# Patient Record
Sex: MALE | Race: ASIAN | Employment: FULL TIME | URBAN - METROPOLITAN AREA
[De-identification: names, ages, dates, MRNs, and addresses within clinical notes are randomized per-mention and may not be internally consistent; named-entity substitution may affect disease eponyms.]

---

## 2024-07-28 ENCOUNTER — APPOINTMENT (OUTPATIENT)
Dept: RADIOLOGY | Facility: HOSPITAL | Age: 34
DRG: 398 | End: 2024-07-28
Payer: COMMERCIAL

## 2024-07-28 ENCOUNTER — HOSPITAL ENCOUNTER (INPATIENT)
Facility: HOSPITAL | Age: 34
LOS: 2 days | Discharge: HOME | DRG: 398 | End: 2024-07-30
Attending: EMERGENCY MEDICINE | Admitting: INTERNAL MEDICINE
Payer: COMMERCIAL

## 2024-07-28 DIAGNOSIS — K35.211 ACUTE APPENDICITIS WITH PERFORATION, GENERALIZED PERITONITIS, AND ABSCESS, UNSPECIFIED WHETHER GANGRENE PRESENT: Primary | ICD-10-CM

## 2024-07-28 DIAGNOSIS — K35.201 ACUTE APPENDICITIS WITH PERFORATION AND GENERALIZED PERITONITIS, UNSPECIFIED WHETHER ABSCESS PRESENT, UNSPECIFIED WHETHER GANGRENE PRESENT: ICD-10-CM

## 2024-07-28 DIAGNOSIS — R10.30 LOWER ABDOMINAL PAIN: ICD-10-CM

## 2024-07-28 LAB
ALBUMIN SERPL BCP-MCNC: 4.2 G/DL (ref 3.4–5)
ALP SERPL-CCNC: 59 U/L (ref 33–120)
ALT SERPL W P-5'-P-CCNC: 33 U/L (ref 10–52)
ANION GAP SERPL CALC-SCNC: 15 MMOL/L (ref 10–20)
APPEARANCE UR: CLEAR
AST SERPL W P-5'-P-CCNC: 18 U/L (ref 9–39)
BASOPHILS # BLD AUTO: 0.03 X10*3/UL (ref 0–0.1)
BASOPHILS NFR BLD AUTO: 0.2 %
BILIRUB SERPL-MCNC: 3 MG/DL (ref 0–1.2)
BILIRUB UR STRIP.AUTO-MCNC: NEGATIVE MG/DL
BUN SERPL-MCNC: 10 MG/DL (ref 6–23)
CALCIUM SERPL-MCNC: 9 MG/DL (ref 8.6–10.3)
CHLORIDE SERPL-SCNC: 98 MMOL/L (ref 98–107)
CO2 SERPL-SCNC: 26 MMOL/L (ref 21–32)
COLOR UR: ABNORMAL
CREAT SERPL-MCNC: 0.95 MG/DL (ref 0.5–1.3)
EGFRCR SERPLBLD CKD-EPI 2021: >90 ML/MIN/1.73M*2
EOSINOPHIL # BLD AUTO: 0.01 X10*3/UL (ref 0–0.7)
EOSINOPHIL NFR BLD AUTO: 0.1 %
ERYTHROCYTE [DISTWIDTH] IN BLOOD BY AUTOMATED COUNT: 15 % (ref 11.5–14.5)
GLUCOSE SERPL-MCNC: 143 MG/DL (ref 74–99)
GLUCOSE UR STRIP.AUTO-MCNC: NORMAL MG/DL
HCT VFR BLD AUTO: 40 % (ref 41–52)
HGB BLD-MCNC: 14.5 G/DL (ref 13.5–17.5)
IMM GRANULOCYTES # BLD AUTO: 0.08 X10*3/UL (ref 0–0.7)
IMM GRANULOCYTES NFR BLD AUTO: 0.5 % (ref 0–0.9)
KETONES UR STRIP.AUTO-MCNC: NEGATIVE MG/DL
LACTATE SERPL-SCNC: 0.8 MMOL/L (ref 0.4–2)
LEUKOCYTE ESTERASE UR QL STRIP.AUTO: NEGATIVE
LYMPHOCYTES # BLD AUTO: 2.33 X10*3/UL (ref 1.2–4.8)
LYMPHOCYTES NFR BLD AUTO: 13.2 %
MAGNESIUM SERPL-MCNC: 1.72 MG/DL (ref 1.6–2.4)
MCH RBC QN AUTO: 33.1 PG (ref 26–34)
MCHC RBC AUTO-ENTMCNC: 36.3 G/DL (ref 32–36)
MCV RBC AUTO: 91 FL (ref 80–100)
MONOCYTES # BLD AUTO: 1.98 X10*3/UL (ref 0.1–1)
MONOCYTES NFR BLD AUTO: 11.2 %
NEUTROPHILS # BLD AUTO: 13.18 X10*3/UL (ref 1.2–7.7)
NEUTROPHILS NFR BLD AUTO: 74.8 %
NITRITE UR QL STRIP.AUTO: NEGATIVE
NRBC BLD-RTO: 0.1 /100 WBCS (ref 0–0)
PH UR STRIP.AUTO: 6.5 [PH]
PLATELET # BLD AUTO: 253 X10*3/UL (ref 150–450)
POTASSIUM SERPL-SCNC: 3.7 MMOL/L (ref 3.5–5.3)
PROT SERPL-MCNC: 7.9 G/DL (ref 6.4–8.2)
PROT UR STRIP.AUTO-MCNC: ABNORMAL MG/DL
RBC # BLD AUTO: 4.38 X10*6/UL (ref 4.5–5.9)
RBC # UR STRIP.AUTO: ABNORMAL /UL
RBC #/AREA URNS AUTO: ABNORMAL /HPF
SODIUM SERPL-SCNC: 135 MMOL/L (ref 136–145)
SP GR UR STRIP.AUTO: 1.03
UROBILINOGEN UR STRIP.AUTO-MCNC: NORMAL MG/DL
WBC # BLD AUTO: 17.6 X10*3/UL (ref 4.4–11.3)
WBC #/AREA URNS AUTO: ABNORMAL /HPF

## 2024-07-28 PROCEDURE — 96365 THER/PROPH/DIAG IV INF INIT: CPT

## 2024-07-28 PROCEDURE — 83735 ASSAY OF MAGNESIUM: CPT | Performed by: NURSE PRACTITIONER

## 2024-07-28 PROCEDURE — 87086 URINE CULTURE/COLONY COUNT: CPT | Mod: GEALAB | Performed by: NURSE PRACTITIONER

## 2024-07-28 PROCEDURE — 85025 COMPLETE CBC W/AUTO DIFF WBC: CPT | Performed by: NURSE PRACTITIONER

## 2024-07-28 PROCEDURE — 74177 CT ABD & PELVIS W/CONTRAST: CPT

## 2024-07-28 PROCEDURE — 83605 ASSAY OF LACTIC ACID: CPT | Performed by: NURSE PRACTITIONER

## 2024-07-28 PROCEDURE — 2550000001 HC RX 255 CONTRASTS: Performed by: NURSE PRACTITIONER

## 2024-07-28 PROCEDURE — 96375 TX/PRO/DX INJ NEW DRUG ADDON: CPT

## 2024-07-28 PROCEDURE — 99223 1ST HOSP IP/OBS HIGH 75: CPT | Performed by: INTERNAL MEDICINE

## 2024-07-28 PROCEDURE — 87040 BLOOD CULTURE FOR BACTERIA: CPT | Mod: GEALAB | Performed by: NURSE PRACTITIONER

## 2024-07-28 PROCEDURE — 81001 URINALYSIS AUTO W/SCOPE: CPT | Performed by: NURSE PRACTITIONER

## 2024-07-28 PROCEDURE — 96376 TX/PRO/DX INJ SAME DRUG ADON: CPT

## 2024-07-28 PROCEDURE — 96361 HYDRATE IV INFUSION ADD-ON: CPT

## 2024-07-28 PROCEDURE — 2500000004 HC RX 250 GENERAL PHARMACY W/ HCPCS (ALT 636 FOR OP/ED): Performed by: NURSE PRACTITIONER

## 2024-07-28 PROCEDURE — 74177 CT ABD & PELVIS W/CONTRAST: CPT | Performed by: STUDENT IN AN ORGANIZED HEALTH CARE EDUCATION/TRAINING PROGRAM

## 2024-07-28 PROCEDURE — 2500000004 HC RX 250 GENERAL PHARMACY W/ HCPCS (ALT 636 FOR OP/ED)

## 2024-07-28 PROCEDURE — 1100000001 HC PRIVATE ROOM DAILY

## 2024-07-28 PROCEDURE — 99285 EMERGENCY DEPT VISIT HI MDM: CPT | Mod: 25

## 2024-07-28 PROCEDURE — 36415 COLL VENOUS BLD VENIPUNCTURE: CPT | Performed by: NURSE PRACTITIONER

## 2024-07-28 PROCEDURE — 80053 COMPREHEN METABOLIC PANEL: CPT | Performed by: NURSE PRACTITIONER

## 2024-07-28 RX ORDER — CALCIUM CARBONATE 200(500)MG
500 TABLET,CHEWABLE ORAL 4 TIMES DAILY PRN
Status: DISCONTINUED | OUTPATIENT
Start: 2024-07-28 | End: 2024-07-30 | Stop reason: HOSPADM

## 2024-07-28 RX ORDER — ONDANSETRON HYDROCHLORIDE 2 MG/ML
4 INJECTION, SOLUTION INTRAVENOUS EVERY 8 HOURS PRN
Status: DISCONTINUED | OUTPATIENT
Start: 2024-07-28 | End: 2024-07-30 | Stop reason: HOSPADM

## 2024-07-28 RX ORDER — ALUMINUM HYDROXIDE, MAGNESIUM HYDROXIDE, AND SIMETHICONE 1200; 120; 1200 MG/30ML; MG/30ML; MG/30ML
30 SUSPENSION ORAL EVERY 6 HOURS PRN
Status: DISCONTINUED | OUTPATIENT
Start: 2024-07-28 | End: 2024-07-30 | Stop reason: HOSPADM

## 2024-07-28 RX ORDER — ONDANSETRON HYDROCHLORIDE 2 MG/ML
4 INJECTION, SOLUTION INTRAVENOUS ONCE
Status: COMPLETED | OUTPATIENT
Start: 2024-07-28 | End: 2024-07-28

## 2024-07-28 RX ORDER — MORPHINE SULFATE 4 MG/ML
4 INJECTION INTRAVENOUS ONCE
Status: COMPLETED | OUTPATIENT
Start: 2024-07-28 | End: 2024-07-28

## 2024-07-28 RX ORDER — MORPHINE SULFATE 4 MG/ML
INJECTION INTRAVENOUS
Status: COMPLETED
Start: 2024-07-28 | End: 2024-07-28

## 2024-07-28 RX ORDER — GUAIFENESIN 600 MG/1
600 TABLET, EXTENDED RELEASE ORAL EVERY 12 HOURS PRN
Status: DISCONTINUED | OUTPATIENT
Start: 2024-07-28 | End: 2024-07-30 | Stop reason: HOSPADM

## 2024-07-28 RX ORDER — ACETAMINOPHEN 650 MG/1
650 SUPPOSITORY RECTAL EVERY 4 HOURS PRN
Status: DISCONTINUED | OUTPATIENT
Start: 2024-07-28 | End: 2024-07-29

## 2024-07-28 RX ORDER — ACETAMINOPHEN 160 MG/5ML
650 SOLUTION ORAL EVERY 4 HOURS PRN
Status: DISCONTINUED | OUTPATIENT
Start: 2024-07-28 | End: 2024-07-29

## 2024-07-28 RX ORDER — GUAIFENESIN/DEXTROMETHORPHAN 100-10MG/5
5 SYRUP ORAL EVERY 4 HOURS PRN
Status: DISCONTINUED | OUTPATIENT
Start: 2024-07-28 | End: 2024-07-30 | Stop reason: HOSPADM

## 2024-07-28 RX ORDER — MORPHINE SULFATE 2 MG/ML
2 INJECTION, SOLUTION INTRAMUSCULAR; INTRAVENOUS
Status: DISCONTINUED | OUTPATIENT
Start: 2024-07-28 | End: 2024-07-29

## 2024-07-28 RX ORDER — ACETAMINOPHEN 325 MG/1
650 TABLET ORAL EVERY 4 HOURS PRN
Status: DISCONTINUED | OUTPATIENT
Start: 2024-07-28 | End: 2024-07-29

## 2024-07-28 RX ORDER — ONDANSETRON 4 MG/1
4 TABLET, FILM COATED ORAL EVERY 8 HOURS PRN
Status: DISCONTINUED | OUTPATIENT
Start: 2024-07-28 | End: 2024-07-30 | Stop reason: HOSPADM

## 2024-07-28 RX ORDER — KETOROLAC TROMETHAMINE 30 MG/ML
30 INJECTION, SOLUTION INTRAMUSCULAR; INTRAVENOUS ONCE
Status: COMPLETED | OUTPATIENT
Start: 2024-07-28 | End: 2024-07-28

## 2024-07-28 RX ORDER — DEXTROSE MONOHYDRATE AND SODIUM CHLORIDE 5; .9 G/100ML; G/100ML
100 INJECTION, SOLUTION INTRAVENOUS CONTINUOUS
Status: DISCONTINUED | OUTPATIENT
Start: 2024-07-29 | End: 2024-07-30 | Stop reason: HOSPADM

## 2024-07-28 RX ORDER — ENOXAPARIN SODIUM 100 MG/ML
40 INJECTION SUBCUTANEOUS EVERY 24 HOURS
Status: DISCONTINUED | OUTPATIENT
Start: 2024-07-29 | End: 2024-07-30 | Stop reason: HOSPADM

## 2024-07-28 SDOH — SOCIAL STABILITY: SOCIAL INSECURITY: HAVE YOU HAD ANY THOUGHTS OF HARMING ANYONE ELSE?: NO

## 2024-07-28 SDOH — SOCIAL STABILITY: SOCIAL INSECURITY: HAVE YOU HAD THOUGHTS OF HARMING ANYONE ELSE?: NO

## 2024-07-28 SDOH — SOCIAL STABILITY: SOCIAL INSECURITY: ARE YOU OR HAVE YOU BEEN THREATENED OR ABUSED PHYSICALLY, EMOTIONALLY, OR SEXUALLY BY ANYONE?: NO

## 2024-07-28 SDOH — SOCIAL STABILITY: SOCIAL INSECURITY: DOES ANYONE TRY TO KEEP YOU FROM HAVING/CONTACTING OTHER FRIENDS OR DOING THINGS OUTSIDE YOUR HOME?: NO

## 2024-07-28 SDOH — SOCIAL STABILITY: SOCIAL INSECURITY: ABUSE: ADULT

## 2024-07-28 SDOH — SOCIAL STABILITY: SOCIAL INSECURITY: HAS ANYONE EVER THREATENED TO HURT YOUR FAMILY OR YOUR PETS?: NO

## 2024-07-28 SDOH — SOCIAL STABILITY: SOCIAL INSECURITY: ARE THERE ANY APPARENT SIGNS OF INJURIES/BEHAVIORS THAT COULD BE RELATED TO ABUSE/NEGLECT?: NO

## 2024-07-28 SDOH — SOCIAL STABILITY: SOCIAL INSECURITY: DO YOU FEEL UNSAFE GOING BACK TO THE PLACE WHERE YOU ARE LIVING?: NO

## 2024-07-28 SDOH — SOCIAL STABILITY: SOCIAL INSECURITY: DO YOU FEEL ANYONE HAS EXPLOITED OR TAKEN ADVANTAGE OF YOU FINANCIALLY OR OF YOUR PERSONAL PROPERTY?: NO

## 2024-07-28 SDOH — SOCIAL STABILITY: SOCIAL INSECURITY: WERE YOU ABLE TO COMPLETE ALL THE BEHAVIORAL HEALTH SCREENINGS?: YES

## 2024-07-28 ASSESSMENT — COGNITIVE AND FUNCTIONAL STATUS - GENERAL
PATIENT BASELINE BEDBOUND: NO
MOBILITY SCORE: 24
DAILY ACTIVITIY SCORE: 24

## 2024-07-28 ASSESSMENT — ACTIVITIES OF DAILY LIVING (ADL)
WALKS IN HOME: INDEPENDENT
LACK_OF_TRANSPORTATION: NO
GROOMING: INDEPENDENT
HEARING - RIGHT EAR: FUNCTIONAL
BATHING: INDEPENDENT
ADEQUATE_TO_COMPLETE_ADL: YES
FEEDING YOURSELF: INDEPENDENT
DRESSING YOURSELF: INDEPENDENT
HEARING - LEFT EAR: FUNCTIONAL
JUDGMENT_ADEQUATE_SAFELY_COMPLETE_DAILY_ACTIVITIES: YES
PATIENT'S MEMORY ADEQUATE TO SAFELY COMPLETE DAILY ACTIVITIES?: YES
TOILETING: INDEPENDENT

## 2024-07-28 ASSESSMENT — PAIN SCALES - GENERAL
PAINLEVEL_OUTOF10: 1
PAINLEVEL_OUTOF10: 5 - MODERATE PAIN
PAINLEVEL_OUTOF10: 5 - MODERATE PAIN
PAINLEVEL_OUTOF10: 1
PAINLEVEL_OUTOF10: 2
PAINLEVEL_OUTOF10: 8

## 2024-07-28 ASSESSMENT — COLUMBIA-SUICIDE SEVERITY RATING SCALE - C-SSRS
6. HAVE YOU EVER DONE ANYTHING, STARTED TO DO ANYTHING, OR PREPARED TO DO ANYTHING TO END YOUR LIFE?: NO
6. HAVE YOU EVER DONE ANYTHING, STARTED TO DO ANYTHING, OR PREPARED TO DO ANYTHING TO END YOUR LIFE?: NO
1. IN THE PAST MONTH, HAVE YOU WISHED YOU WERE DEAD OR WISHED YOU COULD GO TO SLEEP AND NOT WAKE UP?: NO
2. HAVE YOU ACTUALLY HAD ANY THOUGHTS OF KILLING YOURSELF?: NO
2. HAVE YOU ACTUALLY HAD ANY THOUGHTS OF KILLING YOURSELF?: NO
1. IN THE PAST MONTH, HAVE YOU WISHED YOU WERE DEAD OR WISHED YOU COULD GO TO SLEEP AND NOT WAKE UP?: NO

## 2024-07-28 ASSESSMENT — PAIN - FUNCTIONAL ASSESSMENT
PAIN_FUNCTIONAL_ASSESSMENT: 0-10

## 2024-07-28 ASSESSMENT — LIFESTYLE VARIABLES
HOW OFTEN DO YOU HAVE 6 OR MORE DRINKS ON ONE OCCASION: NEVER
HOW MANY STANDARD DRINKS CONTAINING ALCOHOL DO YOU HAVE ON A TYPICAL DAY: PATIENT DOES NOT DRINK
SKIP TO QUESTIONS 9-10: 1
HOW OFTEN DO YOU HAVE A DRINK CONTAINING ALCOHOL: NEVER
EVER HAD A DRINK FIRST THING IN THE MORNING TO STEADY YOUR NERVES TO GET RID OF A HANGOVER: NO
AUDIT-C TOTAL SCORE: 0
HAVE PEOPLE ANNOYED YOU BY CRITICIZING YOUR DRINKING: NO
TOTAL SCORE: 0
EVER FELT BAD OR GUILTY ABOUT YOUR DRINKING: NO
AUDIT-C TOTAL SCORE: 0
HAVE YOU EVER FELT YOU SHOULD CUT DOWN ON YOUR DRINKING: NO

## 2024-07-28 ASSESSMENT — PAIN DESCRIPTION - DESCRIPTORS
DESCRIPTORS: ACHING
DESCRIPTORS: ACHING

## 2024-07-28 ASSESSMENT — PATIENT HEALTH QUESTIONNAIRE - PHQ9
SUM OF ALL RESPONSES TO PHQ9 QUESTIONS 1 & 2: 0
1. LITTLE INTEREST OR PLEASURE IN DOING THINGS: NOT AT ALL
2. FEELING DOWN, DEPRESSED OR HOPELESS: NOT AT ALL

## 2024-07-28 ASSESSMENT — PAIN DESCRIPTION - LOCATION: LOCATION: ABDOMEN

## 2024-07-28 ASSESSMENT — PAIN DESCRIPTION - PAIN TYPE: TYPE: ACUTE PAIN

## 2024-07-28 NOTE — ED PROVIDER NOTES
[facility]  ED  Encounter Note  Admit Date/RoomTime: 2024  6:19 PM  ED Room: [place] [address]  NAME: Sheldon Acosta  : 1990  MRN: [id number]     Chief Complaint:  Abdominal Pain    HISTORY OF PRESENT ILLNESS        Sheldon Acosta is a 33 y.o. male who presents to the ED for evaluation of lower abd pain which started last evening bilateral around 5 pm on Saturday. He stated that he had testicular cancer with right testicle removed end of 2024. He is from [facility] since . He will return back to his home . Denied fever or chills and is dry heaving. He took addy this am which did not help. Pain is severe.     ROS   Pertinent positives and negatives are stated within HPI, all other systems reviewed and are negative.    Past Medical History:  has no past medical history on file.    Surgical History:  has no past surgical history on file.    Social History:  reports that he has never smoked. He has never used smokeless tobacco. He reports that he does not drink alcohol and does not use drugs. denied nicotine alcohol or street drug use    Family History: family history is not on file.     Allergies: Patient has no known allergies.    PHYSICAL EXAM   Oxygen Saturation Interpretation: Normal.        Physical Exam  Constitutional/General: Alert and oriented x3, well appearing, non toxic  HEENT:  NC/NT. PERRLA.  Airway patent.  Neck: Supple, full ROM. No midline vertebral tenderness or crepitus.   Respiratory: Lung sounds clear to auscultation bilaterally. No wheezes, rhonchi or stridor. Not in respiratory distress.  CV:  Regular rate. Regular rhythm. No murmurs or rubs. 2+ distal pulses.  GI:  Abdomen soft, non-tender, non-distended. +BS. No rebound, guarding, or rigidity. No pulsatile masses.  Musculoskeletal: Moves all extremities x 4. Warm and well perfused. Capillary refill <3 seconds  Integument: Skin warm and dry. No rashes.    Neurologic: Alert and oriented with no focal deficits, symmetric strength 5/5 in the upper and lower extremities bilaterally.  Psychiatric: Normal affect.    Lab / Imaging Results   (All laboratory and radiology results have been personally reviewed by myself)  Labs:  Results for orders placed or performed during the hospital encounter of 07/28/24   CBC and Auto Differential   Result Value Ref Range    WBC 17.6 (H) 4.4 - 11.3 x10*3/uL    nRBC 0.1 (H) 0.0 - 0.0 /100 WBCs    RBC 4.38 (L) 4.50 - 5.90 x10*6/uL    Hemoglobin 14.5 13.5 - 17.5 g/dL    Hematocrit 40.0 (L) 41.0 - 52.0 %    MCV 91 80 - 100 fL    MCH 33.1 26.0 - 34.0 pg    MCHC 36.3 (H) 32.0 - 36.0 g/dL    RDW 15.0 (H) 11.5 - 14.5 %    Platelets 253 150 - 450 x10*3/uL    Neutrophils % 74.8 40.0 - 80.0 %    Immature Granulocytes %, Automated 0.5 0.0 - 0.9 %    Lymphocytes % 13.2 13.0 - 44.0 %    Monocytes % 11.2 2.0 - 10.0 %    Eosinophils % 0.1 0.0 - 6.0 %    Basophils % 0.2 0.0 - 2.0 %    Neutrophils Absolute 13.18 (H) 1.20 - 7.70 x10*3/uL    Immature Granulocytes Absolute, Automated 0.08 0.00 - 0.70 x10*3/uL    Lymphocytes Absolute 2.33 1.20 - 4.80 x10*3/uL    Monocytes Absolute 1.98 (H) 0.10 - 1.00 x10*3/uL    Eosinophils Absolute 0.01 0.00 - 0.70 x10*3/uL    Basophils Absolute 0.03 0.00 - 0.10 x10*3/uL   Magnesium   Result Value Ref Range    Magnesium 1.72 1.60 - 2.40 mg/dL   Comprehensive metabolic panel   Result Value Ref Range    Glucose 143 (H) 74 - 99 mg/dL    Sodium 135 (L) 136 - 145 mmol/L    Potassium 3.7 3.5 - 5.3 mmol/L    Chloride 98 98 - 107 mmol/L    Bicarbonate 26 21 - 32 mmol/L    Anion Gap 15 10 - 20 mmol/L    Urea Nitrogen 10 6 - 23 mg/dL    Creatinine 0.95 0.50 - 1.30 mg/dL    eGFR >90 >60 mL/min/1.73m*2    Calcium 9.0 8.6 - 10.3 mg/dL    Albumin 4.2 3.4 - 5.0 g/dL    Alkaline Phosphatase 59 33 - 120 U/L    Total Protein 7.9 6.4 - 8.2 g/dL    AST 18 9 - 39 U/L    Bilirubin, Total 3.0 (H) 0.0 - 1.2 mg/dL    ALT 33 10 - 52 U/L   Lactate    Result Value Ref Range    Lactate 0.8 0.4 - 2.0 mmol/L   Urinalysis with Reflex Culture and Microscopic   Result Value Ref Range    Color, Urine Light-Yellow Light-Yellow, Yellow, Dark-Yellow    Appearance, Urine Clear Clear    Specific Gravity, Urine 1.032 1.005 - 1.035    pH, Urine 6.5 5.0, 5.5, 6.0, 6.5, 7.0, 7.5, 8.0    Protein, Urine 30 (1+) (A) NEGATIVE, 10 (TRACE), 20 (TRACE) mg/dL    Glucose, Urine Normal Normal mg/dL    Blood, Urine 1.0 (3+) (A) NEGATIVE    Ketones, Urine NEGATIVE NEGATIVE mg/dL    Bilirubin, Urine NEGATIVE NEGATIVE    Urobilinogen, Urine Normal Normal mg/dL    Nitrite, Urine NEGATIVE NEGATIVE    Leukocyte Esterase, Urine NEGATIVE NEGATIVE   Urinalysis Microscopic   Result Value Ref Range    WBC, Urine 11-20 (A) 1-5, NONE /HPF    RBC, Urine 3-5 NONE, 1-2, 3-5 /HPF     Imaging:  All Radiology results interpreted by Radiologist unless otherwise noted.  CT abdomen pelvis w IV contrast   Final Result   1. Acute appendicitis with probable appendiceal rupture. No abscess   or free air.        2. Three solid my nodules in the left lower lobe measuring 0.6 cm,   0.3 cm, and 0.3 cm. In the setting of recently diagnosed testicular   cancer, differential possibilities include metastatic disease and   sequela of prior granulomatous infection. Recommend patient obtain CT   imaging/report and correlate with prior imaging studies if possible.   Recommend appropriate oncological follow-up and imaging surveillance.   No evidence of intra-abdominal lymphadenopathy or metastasis in the   abdomen.        3. Diffusely inflamed urinary bladder wall consistent with cystitis.   Correlation with urinalysis is recommended.        4. Ill-defined fat stranding along the right groin at site of   surgical approach for or he ectomy. No fluid collections.        MACRO:   None.        Signed by: Estevan Carver 7/28/2024 8:11 PM   Dictation workstation:   [id number]          ED Course / Medical Decision Making      Medications   sodium chloride 0.9 % bolus 2,000 mL (0 mL intravenous Stopped 7/28/24 2030)   ketorolac (Toradol) injection 30 mg (30 mg intravenous Given 7/28/24 1838)   ondansetron (Zofran) injection 4 mg (4 mg intravenous Given 7/28/24 1838)   morphine injection 4 mg (4 mg intravenous Given 7/28/24 1916)   iohexol (OMNIPaque) 350 mg iodine/mL solution 75 mL (75 mL intravenous Given 7/28/24 1934)   piperacillin-tazobactam (Zosyn) 4.5 g in dextrose (iso)  mL (0 g intravenous Stopped 7/28/24 2058)   morphine injection 4 mg (4 mg intravenous Given 7/28/24 2104)   sodium chloride 0.9 % bolus 550 mL (0 mL intravenous Stopped 7/28/24 2215)     Diagnoses as of 07/28/24 2233   Lower abdominal pain   Acute appendicitis with perforation and generalized peritonitis, unspecified whether abscess present, unspecified whether gangrene present     Re-examination:    Patient’s condition stable.    Consult(s):   IP CONSULT TO ACUTE CARE SURGERY        MDM:       Sheldon Acosta is a 33 y.o. male who presents to the ED for evaluation of lower abd pain which started last evening bilateral around 5 pm on Saturday. He stated that he had testicular cancer with right testicle removed end of June 2024. He is from [facility] since July 26th. He will return back to his home August 4th. Denied fever or chills and is dry heaving. He took addy this am which did not help. Pain is severe.     ED course   Wbc 17.6  Total bili 3.0  Lactate 0.8  Mag 1.72  Urine blood 3+ wbc 11-20.  Received 2550 ml of fluids, Zosyn 4.5 grams IV Toradol 30 mg IVP Zofran 4 mg IVP and morphine 4 mg IVP x two.  Ct scan abd showed acute appendicitis with rupture  Dr Garza aware  Npo OR tomorrow  Admit to hospitalist  Ddx: acute appendicitis   Plan of Care/Counseling:  I reviewed today's visit with the patient friends in addition to providing specific details for the plan of care and counseling regarding the diagnosis and prognosis.   Questions are answered at this time and are agreeable with the plan.    ASSESSMENT     1. Lower abdominal pain    2. Acute appendicitis with perforation, generalized peritonitis, and abscess, unspecified whether gangrene present    3. Acute appendicitis with perforation and generalized peritonitis, unspecified whether abscess present, unspecified whether gangrene present      PLAN   Admit to hospitalist       New Medications     New Medications Ordered This Visit   Medications    sodium chloride 0.9 % bolus 2,000 mL    ketorolac (Toradol) injection 30 mg    ondansetron (Zofran) injection 4 mg    morphine injection 4 mg    morphine injection  - [employer] Override Pull     Created by cabinet override    iohexol (OMNIPaque) 350 mg iodine/mL solution 75 mL    piperacillin-tazobactam (Zosyn) 4.5 g in dextrose (iso)  mL     Order Specific Question:   Dosing of this medication varies based on severity of illness. Does this patient have sepsis or concern for sepsis (probable or documented infection plus systemic manifestations of infection)?     Answer:   Yes     Order Specific Question:   Suspected Indication (Select all that apply)     Answer:   Abdominal Infection     Order Specific Question:   Type of Therapy     Answer:   Empiric     Order Specific Question:   Type of infection     Answer:   Other (specify)    morphine injection 4 mg    sodium chloride 0.9 % bolus 550 mL     Electronically signed by ANDERSON Mcgregor   **This report was transcribed using voice recognition software. Every effort was made to ensure accuracy; however, inadvertent computerized transcription errors may be present.  END OF ED PROVIDER NOTE     ANDERSON Mcgregor  07/28/24 7707

## 2024-07-28 NOTE — ED TRIAGE NOTES
Patient from [facility] c/o lower abdominal pain with nausea and loss of appetite for the past 2 days. Patient has a recent hx of testicle CA in June of this year.

## 2024-07-29 ENCOUNTER — ANESTHESIA (OUTPATIENT)
Dept: OPERATING ROOM | Facility: HOSPITAL | Age: 34
End: 2024-07-29

## 2024-07-29 ENCOUNTER — ANESTHESIA EVENT (OUTPATIENT)
Dept: OPERATING ROOM | Facility: HOSPITAL | Age: 34
End: 2024-07-29

## 2024-07-29 LAB
ABO GROUP (TYPE) IN BLOOD: NORMAL
ABO GROUP (TYPE) IN BLOOD: NORMAL
ANION GAP SERPL CALC-SCNC: 11 MMOL/L (ref 10–20)
ANTIBODY SCREEN: NORMAL
BUN SERPL-MCNC: 9 MG/DL (ref 6–23)
CALCIUM SERPL-MCNC: 7.8 MG/DL (ref 8.6–10.3)
CHLORIDE SERPL-SCNC: 106 MMOL/L (ref 98–107)
CO2 SERPL-SCNC: 27 MMOL/L (ref 21–32)
CREAT SERPL-MCNC: 0.95 MG/DL (ref 0.5–1.3)
EGFRCR SERPLBLD CKD-EPI 2021: >90 ML/MIN/1.73M*2
ERYTHROCYTE [DISTWIDTH] IN BLOOD BY AUTOMATED COUNT: 14.6 % (ref 11.5–14.5)
GLUCOSE SERPL-MCNC: 121 MG/DL (ref 74–99)
HCT VFR BLD AUTO: 34.8 % (ref 41–52)
HGB BLD-MCNC: 12.2 G/DL (ref 13.5–17.5)
HOLD SPECIMEN: NORMAL
MCH RBC QN AUTO: 32.8 PG (ref 26–34)
MCHC RBC AUTO-ENTMCNC: 35.1 G/DL (ref 32–36)
MCV RBC AUTO: 94 FL (ref 80–100)
NRBC BLD-RTO: 0 /100 WBCS (ref 0–0)
PLATELET # BLD AUTO: 204 X10*3/UL (ref 150–450)
POTASSIUM SERPL-SCNC: 3.8 MMOL/L (ref 3.5–5.3)
RBC # BLD AUTO: 3.72 X10*6/UL (ref 4.5–5.9)
RH FACTOR (ANTIGEN D): NORMAL
RH FACTOR (ANTIGEN D): NORMAL
SODIUM SERPL-SCNC: 140 MMOL/L (ref 136–145)
WBC # BLD AUTO: 12.5 X10*3/UL (ref 4.4–11.3)

## 2024-07-29 PROCEDURE — 3700000002 HC GENERAL ANESTHESIA TIME - EACH INCREMENTAL 1 MINUTE: Performed by: SURGERY

## 2024-07-29 PROCEDURE — A47562 PR LAP,CHOLECYSTECTOMY: Performed by: NURSE ANESTHETIST, CERTIFIED REGISTERED

## 2024-07-29 PROCEDURE — 99223 1ST HOSP IP/OBS HIGH 75: CPT | Performed by: SURGERY

## 2024-07-29 PROCEDURE — 36415 COLL VENOUS BLD VENIPUNCTURE: CPT | Performed by: STUDENT IN AN ORGANIZED HEALTH CARE EDUCATION/TRAINING PROGRAM

## 2024-07-29 PROCEDURE — 2500000004 HC RX 250 GENERAL PHARMACY W/ HCPCS (ALT 636 FOR OP/ED): Performed by: STUDENT IN AN ORGANIZED HEALTH CARE EDUCATION/TRAINING PROGRAM

## 2024-07-29 PROCEDURE — 2500000004 HC RX 250 GENERAL PHARMACY W/ HCPCS (ALT 636 FOR OP/ED): Performed by: SURGERY

## 2024-07-29 PROCEDURE — 2500000005 HC RX 250 GENERAL PHARMACY W/O HCPCS: Performed by: STUDENT IN AN ORGANIZED HEALTH CARE EDUCATION/TRAINING PROGRAM

## 2024-07-29 PROCEDURE — 2720000007 HC OR 272 NO HCPCS: Performed by: SURGERY

## 2024-07-29 PROCEDURE — 2500000005 HC RX 250 GENERAL PHARMACY W/O HCPCS: Performed by: NURSE ANESTHETIST, CERTIFIED REGISTERED

## 2024-07-29 PROCEDURE — 2500000004 HC RX 250 GENERAL PHARMACY W/ HCPCS (ALT 636 FOR OP/ED): Performed by: NURSE ANESTHETIST, CERTIFIED REGISTERED

## 2024-07-29 PROCEDURE — 88304 TISSUE EXAM BY PATHOLOGIST: CPT | Performed by: PATHOLOGY

## 2024-07-29 PROCEDURE — 44970 LAPAROSCOPY APPENDECTOMY: CPT | Performed by: PHYSICIAN ASSISTANT

## 2024-07-29 PROCEDURE — 7100000002 HC RECOVERY ROOM TIME - EACH INCREMENTAL 1 MINUTE: Performed by: SURGERY

## 2024-07-29 PROCEDURE — A47562 PR LAP,CHOLECYSTECTOMY: Performed by: STUDENT IN AN ORGANIZED HEALTH CARE EDUCATION/TRAINING PROGRAM

## 2024-07-29 PROCEDURE — 3700000001 HC GENERAL ANESTHESIA TIME - INITIAL BASE CHARGE: Performed by: SURGERY

## 2024-07-29 PROCEDURE — 0752T DGTZ GLS MCRSCP SLD LVL III: CPT | Mod: TC,GEALAB | Performed by: SURGERY

## 2024-07-29 PROCEDURE — 3600000004 HC OR TIME - INITIAL BASE CHARGE - PROCEDURE LEVEL FOUR: Performed by: SURGERY

## 2024-07-29 PROCEDURE — 0DTJ4ZZ RESECTION OF APPENDIX, PERCUTANEOUS ENDOSCOPIC APPROACH: ICD-10-PCS | Performed by: SURGERY

## 2024-07-29 PROCEDURE — 99232 SBSQ HOSP IP/OBS MODERATE 35: CPT | Performed by: STUDENT IN AN ORGANIZED HEALTH CARE EDUCATION/TRAINING PROGRAM

## 2024-07-29 PROCEDURE — 2500000004 HC RX 250 GENERAL PHARMACY W/ HCPCS (ALT 636 FOR OP/ED): Performed by: INTERNAL MEDICINE

## 2024-07-29 PROCEDURE — 86901 BLOOD TYPING SEROLOGIC RH(D): CPT | Performed by: STUDENT IN AN ORGANIZED HEALTH CARE EDUCATION/TRAINING PROGRAM

## 2024-07-29 PROCEDURE — 3600000009 HC OR TIME - EACH INCREMENTAL 1 MINUTE - PROCEDURE LEVEL FOUR: Performed by: SURGERY

## 2024-07-29 PROCEDURE — 2500000004 HC RX 250 GENERAL PHARMACY W/ HCPCS (ALT 636 FOR OP/ED): Mod: JZ | Performed by: SURGERY

## 2024-07-29 PROCEDURE — RXMED WILLOW AMBULATORY MEDICATION CHARGE

## 2024-07-29 PROCEDURE — 85027 COMPLETE CBC AUTOMATED: CPT | Performed by: INTERNAL MEDICINE

## 2024-07-29 PROCEDURE — 1100000001 HC PRIVATE ROOM DAILY

## 2024-07-29 PROCEDURE — 7100000001 HC RECOVERY ROOM TIME - INITIAL BASE CHARGE: Performed by: SURGERY

## 2024-07-29 PROCEDURE — 36415 COLL VENOUS BLD VENIPUNCTURE: CPT | Performed by: INTERNAL MEDICINE

## 2024-07-29 PROCEDURE — 80048 BASIC METABOLIC PNL TOTAL CA: CPT | Performed by: INTERNAL MEDICINE

## 2024-07-29 PROCEDURE — 2500000001 HC RX 250 WO HCPCS SELF ADMINISTERED DRUGS (ALT 637 FOR MEDICARE OP): Performed by: SURGERY

## 2024-07-29 PROCEDURE — 44970 LAPAROSCOPY APPENDECTOMY: CPT | Performed by: SURGERY

## 2024-07-29 RX ORDER — OXYCODONE HYDROCHLORIDE 5 MG/1
5 TABLET ORAL EVERY 6 HOURS PRN
Qty: 5 TABLET | Refills: 0 | Status: SHIPPED | OUTPATIENT
Start: 2024-07-29

## 2024-07-29 RX ORDER — ACETAMINOPHEN 325 MG/1
650 TABLET ORAL EVERY 4 HOURS PRN
Status: DISCONTINUED | OUTPATIENT
Start: 2024-07-29 | End: 2024-07-29

## 2024-07-29 RX ORDER — POLYETHYLENE GLYCOL 3350 17 G/17G
17 POWDER, FOR SOLUTION ORAL DAILY
Status: DISCONTINUED | OUTPATIENT
Start: 2024-07-29 | End: 2024-07-30 | Stop reason: HOSPADM

## 2024-07-29 RX ORDER — METHOCARBAMOL 100 MG/ML
1000 INJECTION, SOLUTION INTRAMUSCULAR; INTRAVENOUS ONCE
Status: DISCONTINUED | OUTPATIENT
Start: 2024-07-29 | End: 2024-07-29 | Stop reason: HOSPADM

## 2024-07-29 RX ORDER — ALBUTEROL SULFATE 0.83 MG/ML
2.5 SOLUTION RESPIRATORY (INHALATION) ONCE AS NEEDED
Status: DISCONTINUED | OUTPATIENT
Start: 2024-07-29 | End: 2024-07-29 | Stop reason: HOSPADM

## 2024-07-29 RX ORDER — ACETAMINOPHEN 160 MG/5ML
650 SOLUTION ORAL EVERY 4 HOURS PRN
Status: DISCONTINUED | OUTPATIENT
Start: 2024-07-29 | End: 2024-07-29

## 2024-07-29 RX ORDER — OXYCODONE HYDROCHLORIDE 5 MG/1
5 TABLET ORAL EVERY 4 HOURS PRN
Status: DISCONTINUED | OUTPATIENT
Start: 2024-07-29 | End: 2024-07-29 | Stop reason: HOSPADM

## 2024-07-29 RX ORDER — ROCURONIUM BROMIDE 10 MG/ML
INJECTION, SOLUTION INTRAVENOUS AS NEEDED
Status: DISCONTINUED | OUTPATIENT
Start: 2024-07-29 | End: 2024-07-29

## 2024-07-29 RX ORDER — LIDOCAINE HYDROCHLORIDE 10 MG/ML
INJECTION, SOLUTION EPIDURAL; INFILTRATION; INTRACAUDAL; PERINEURAL AS NEEDED
Status: DISCONTINUED | OUTPATIENT
Start: 2024-07-29 | End: 2024-07-29

## 2024-07-29 RX ORDER — POLYETHYLENE GLYCOL 3350 17 G/17G
17 POWDER, FOR SOLUTION ORAL DAILY
Qty: 119 G | Refills: 0 | Status: SHIPPED | OUTPATIENT
Start: 2024-07-29 | End: 2024-08-08

## 2024-07-29 RX ORDER — OXYCODONE HYDROCHLORIDE 5 MG/1
5 TABLET ORAL EVERY 4 HOURS PRN
Status: DISCONTINUED | OUTPATIENT
Start: 2024-07-29 | End: 2024-07-30 | Stop reason: HOSPADM

## 2024-07-29 RX ORDER — SUCCINYLCHOLINE CHLORIDE 20 MG/ML
INJECTION INTRAMUSCULAR; INTRAVENOUS AS NEEDED
Status: DISCONTINUED | OUTPATIENT
Start: 2024-07-29 | End: 2024-07-29

## 2024-07-29 RX ORDER — CEFAZOLIN 1 G/1
INJECTION, POWDER, FOR SOLUTION INTRAVENOUS AS NEEDED
Status: DISCONTINUED | OUTPATIENT
Start: 2024-07-29 | End: 2024-07-29

## 2024-07-29 RX ORDER — TRAMADOL HYDROCHLORIDE 50 MG/1
50 TABLET ORAL EVERY 4 HOURS PRN
Status: DISCONTINUED | OUTPATIENT
Start: 2024-07-29 | End: 2024-07-30 | Stop reason: HOSPADM

## 2024-07-29 RX ORDER — ACETAMINOPHEN 650 MG/1
650 SUPPOSITORY RECTAL EVERY 4 HOURS PRN
Status: DISCONTINUED | OUTPATIENT
Start: 2024-07-29 | End: 2024-07-29

## 2024-07-29 RX ORDER — IBUPROFEN 600 MG/1
600 TABLET ORAL EVERY 6 HOURS
Qty: 10 TABLET | Refills: 0 | Status: SHIPPED | OUTPATIENT
Start: 2024-07-29 | End: 2024-08-02

## 2024-07-29 RX ORDER — LABETALOL HYDROCHLORIDE 5 MG/ML
5 INJECTION, SOLUTION INTRAVENOUS ONCE AS NEEDED
Status: DISCONTINUED | OUTPATIENT
Start: 2024-07-29 | End: 2024-07-29 | Stop reason: HOSPADM

## 2024-07-29 RX ORDER — ONDANSETRON HYDROCHLORIDE 2 MG/ML
4 INJECTION, SOLUTION INTRAVENOUS ONCE AS NEEDED
Status: DISCONTINUED | OUTPATIENT
Start: 2024-07-29 | End: 2024-07-29 | Stop reason: HOSPADM

## 2024-07-29 RX ORDER — PROPOFOL 10 MG/ML
INJECTION, EMULSION INTRAVENOUS AS NEEDED
Status: DISCONTINUED | OUTPATIENT
Start: 2024-07-29 | End: 2024-07-29

## 2024-07-29 RX ORDER — IBUPROFEN 600 MG/1
600 TABLET ORAL EVERY 6 HOURS SCHEDULED
Status: DISCONTINUED | OUTPATIENT
Start: 2024-07-29 | End: 2024-07-30 | Stop reason: HOSPADM

## 2024-07-29 RX ORDER — FENTANYL CITRATE 50 UG/ML
INJECTION, SOLUTION INTRAMUSCULAR; INTRAVENOUS AS NEEDED
Status: DISCONTINUED | OUTPATIENT
Start: 2024-07-29 | End: 2024-07-29

## 2024-07-29 RX ORDER — HYDRALAZINE HYDROCHLORIDE 20 MG/ML
5 INJECTION INTRAMUSCULAR; INTRAVENOUS EVERY 30 MIN PRN
Status: DISCONTINUED | OUTPATIENT
Start: 2024-07-29 | End: 2024-07-29 | Stop reason: HOSPADM

## 2024-07-29 RX ORDER — ONDANSETRON 4 MG/1
4 TABLET, ORALLY DISINTEGRATING ORAL EVERY 6 HOURS PRN
Qty: 15 TABLET | Refills: 0 | Status: SHIPPED | OUTPATIENT
Start: 2024-07-29

## 2024-07-29 RX ORDER — SODIUM CHLORIDE, SODIUM LACTATE, POTASSIUM CHLORIDE, CALCIUM CHLORIDE 600; 310; 30; 20 MG/100ML; MG/100ML; MG/100ML; MG/100ML
100 INJECTION, SOLUTION INTRAVENOUS CONTINUOUS
Status: DISCONTINUED | OUTPATIENT
Start: 2024-07-29 | End: 2024-07-29 | Stop reason: HOSPADM

## 2024-07-29 RX ORDER — ACETAMINOPHEN 325 MG/1
650 TABLET ORAL EVERY 6 HOURS
Qty: 20 TABLET | Refills: 0 | Status: SHIPPED | OUTPATIENT
Start: 2024-07-29 | End: 2024-08-02

## 2024-07-29 RX ORDER — BUPIVACAINE HYDROCHLORIDE 5 MG/ML
INJECTION, SOLUTION EPIDURAL; INTRACAUDAL AS NEEDED
Status: DISCONTINUED | OUTPATIENT
Start: 2024-07-29 | End: 2024-07-29 | Stop reason: HOSPADM

## 2024-07-29 RX ORDER — SODIUM CHLORIDE, SODIUM LACTATE, POTASSIUM CHLORIDE, CALCIUM CHLORIDE 600; 310; 30; 20 MG/100ML; MG/100ML; MG/100ML; MG/100ML
100 INJECTION, SOLUTION INTRAVENOUS CONTINUOUS
Status: DISCONTINUED | OUTPATIENT
Start: 2024-07-29 | End: 2024-07-29

## 2024-07-29 RX ORDER — ACETAMINOPHEN 325 MG/1
650 TABLET ORAL EVERY 6 HOURS
Status: DISCONTINUED | OUTPATIENT
Start: 2024-07-29 | End: 2024-07-30 | Stop reason: HOSPADM

## 2024-07-29 SDOH — HEALTH STABILITY: MENTAL HEALTH: CURRENT SMOKER: 0

## 2024-07-29 ASSESSMENT — PAIN DESCRIPTION - ORIENTATION
ORIENTATION: UPPER;RIGHT
ORIENTATION: RIGHT;UPPER

## 2024-07-29 ASSESSMENT — PAIN SCALES - GENERAL
PAINLEVEL_OUTOF10: 0 - NO PAIN
PAINLEVEL_OUTOF10: 0 - NO PAIN
PAINLEVEL_OUTOF10: 2
PAINLEVEL_OUTOF10: 7
PAINLEVEL_OUTOF10: 0 - NO PAIN
PAINLEVEL_OUTOF10: 7

## 2024-07-29 ASSESSMENT — ENCOUNTER SYMPTOMS
ROS GI COMMENTS: SEE HPI
EYES NEGATIVE: 1
CARDIOVASCULAR NEGATIVE: 1
ALLERGIC/IMMUNOLOGIC NEGATIVE: 1
CONSTITUTIONAL NEGATIVE: 1
NEUROLOGICAL NEGATIVE: 1
RESPIRATORY NEGATIVE: 1
PSYCHIATRIC NEGATIVE: 1
HEMATOLOGIC/LYMPHATIC NEGATIVE: 1
MUSCULOSKELETAL NEGATIVE: 1
ENDOCRINE NEGATIVE: 1

## 2024-07-29 ASSESSMENT — PAIN - FUNCTIONAL ASSESSMENT
PAIN_FUNCTIONAL_ASSESSMENT: 0-10

## 2024-07-29 ASSESSMENT — PAIN DESCRIPTION - LOCATION
LOCATION: ABDOMEN
LOCATION: ABDOMEN

## 2024-07-29 NOTE — CONSULTS
"General/Trauma Surgery History and Physical      History Of Present Illness  Sheldon Acosta is a 33 y.o. male visiting from [facility] on a business trip who developed right lower quadrant pain starting last night after eating dinner.  Patient arrived in the [facility] on July 26.  After dinner last night, he noticed right lower quadrant pain that radiated to his back and associated with bloating.  Pain is constant, not improving and therefore came to our emergency department.  Denies nausea, vomiting, diarrhea or constipation.  Denies fevers or chills.  Upon ED workup, he was found to have a leukocytosis of 17.6 and CT of the abdomen/pelvis revealed acute appendicitis with likely rupture.  Patient feeling improved this morning although having slight increased right lower quadrant pain again today.       Past Medical History  GERD  Cholelithiasis  Testicular cancer status post right orchiectomy    Surgical History  Right orchiectomy  Right knee patellar surgery     Social History  Never smoker, denies alcohol or illicits.  Lives in [facility], visiting on a business trip.    Family History  Mother-diabetes    Allergies  Patient has no known allergies.    Meds  No current outpatient medications     Review of Systems   A complete 10 point review of systems was performed and is negative except as noted in the history of present illness.     Last Recorded Vitals  Blood pressure 146/82, pulse 104, temperature 36.7 °C (98.1 °F), resp. rate 17, height 1.78 m (5' 10.08\"), weight 85 kg (187 lb 6.3 oz), SpO2 96%.    0-10 (Numeric) Pain Score: 0 - No pain     Physical Exam  Constitutional: Pleasant, in no acute distress. Sitting up in bed.  Neuro: Alert, oriented. Follows commands.   Eyes: Extraocular motions intact. No scleral icterus. Conjunctiva pink.   EENT: Mucous membranes moist. Normal dentition. Hears normal speaking voice.  Neck: Supple. No masses.  Cardiovascular: Regular rate. Palpable pulses bilaterally. No " pitting edema.   Respiratory: No increased work of breathing or audible wheeze. Equal expansion.  Abdomen: Soft, non-obese abdomen. Nondistended.  Tender to right lower quadrant without guarding.  MSK: Moves all extremities. No atrophy.  Lymphatic: No palpable lymph nodes. No lymphedema.   Skin: Warm, dry, intact. No rashes or lesions.   Psychological: Appropriate mood and behavior.           Relevant Results  Laboratory Results:  CBC:   Lab Results   Component Value Date    WBC 12.5 (H) 07/29/2024    RBC 3.72 (L) 07/29/2024    HGB 12.2 (L) 07/29/2024    HCT 34.8 (L) 07/29/2024     07/29/2024       RFP:   Lab Results   Component Value Date     07/29/2024    K 3.8 07/29/2024     07/29/2024    CO2 27 07/29/2024    BUN 9 07/29/2024    CREATININE 0.95 07/29/2024    CALCIUM 7.8 (L) 07/29/2024    MG 1.72 07/28/2024        LFTs:   Lab Results   Component Value Date    PROT 7.9 07/28/2024    ALBUMIN 4.2 07/28/2024    BILITOT 3.0 (H) 07/28/2024    ALKPHOS 59 07/28/2024    AST 18 07/28/2024    ALT 33 07/28/2024         Imaging:  CT abdomen pelvis w IV contrast 07/28/2024    Impression  1. Acute appendicitis with probable appendiceal rupture. No abscess  or free air.    2. Three solid my nodules in the left lower lobe measuring 0.6 cm,  0.3 cm, and 0.3 cm. In the setting of recently diagnosed testicular  cancer, differential possibilities include metastatic disease and  sequela of prior granulomatous infection. Recommend patient obtain CT  imaging/report and correlate with prior imaging studies if possible.  Recommend appropriate oncological follow-up and imaging surveillance.  No evidence of intra-abdominal lymphadenopathy or metastasis in the  abdomen.    3. Diffusely inflamed urinary bladder wall consistent with cystitis.  Correlation with urinalysis is recommended.    4. Ill-defined fat stranding along the right groin at site of  surgical approach for or he ectomy. No fluid  collections.      Assessment/Plan   This is a 33 y.o. male traveling from [facility] who presented with right lower quadrant pain starting last night after dinner.  Leukocytosis of 17, CT revealing acute appendicitis with concern for perforation.  Discussed he would benefit from a laparoscopic appendectomy today in the OR.    Plan:  -- Laparoscopic appendectomy, possible drain placement in OR  --Continue Zosyn every 6 hours  --IV hydration, antiemetics, pain regimen  --Probable discharge tomorrow morning pending intraoperative findings         Seen and discussed with Dr. Garza who is in agreement with plan. Please doc halo with questions.    Ila Mcdonough PA-C        I have seen and reviewed the patient with the PA.  I agree with their note as written above.  Any changes or corrections have been made in the body of the note.    Mark Garza MD  General Surgery  Office: [phone]  Fax:     [phone]  12:05 PM   07/29/24

## 2024-07-29 NOTE — H&P
History Of Present Illness  Sheldon Acosta is a 33 y.o. male, visiting from [facility], with history of GERD, cholelithiasis and testicular cancer presenting with abdominal pain. He is here on a business trip and arrived on July 26. He reports developing lower abdominal pain radiating to his back with bloating yesterday after eating dinner. He tried a couple of home remedies without improvement and by noon today he was still uncomfortable, so he presented to the ED. He denies nausea, vomiting, diarrhea, hematochezia, melena, fever or chills. Labs in ED were notable mainly for a WBC of 17.6. CT of the abdomen and pelvis yielded acute appendicitis with probable appendiceal rupture.     Past Medical History  GERD  Cholelithiasis  Testicular cancer s/p right orchiectomy    Past Surgical History  Right orchiectomy  Right knee patellar surgery     Social History  He reports that he has never smoked. He has never used smokeless tobacco. He reports that he does not drink alcohol and does not use drugs. He is here from [facility] on a business trip.    Family History  Positive for DM in mother. No family history of heart disease, cancer or CVA.     Allergies  Patient has no known allergies.    Review of Systems   Constitutional: Negative.    HENT: Negative.     Eyes: Negative.    Respiratory: Negative.     Cardiovascular: Negative.    Gastrointestinal:         See HPI   Endocrine: Negative.    Genitourinary:         See HPI   Musculoskeletal: Negative.    Skin: Negative.    Allergic/Immunologic: Negative.    Neurological: Negative.    Hematological: Negative.    Psychiatric/Behavioral: Negative.          Physical Exam  Constitutional:       General: He is not in acute distress.     Appearance: Normal appearance. He is not ill-appearing, toxic-appearing or diaphoretic.   HENT:      Head: Normocephalic and atraumatic.      Nose: Nose normal.      Mouth/Throat:      Mouth: Mucous membranes are dry.      Pharynx:  "Oropharynx is clear. No oropharyngeal exudate or posterior oropharyngeal erythema.   Eyes:      General: No scleral icterus.        Right eye: No discharge.         Left eye: No discharge.      Extraocular Movements: Extraocular movements intact.      Conjunctiva/sclera: Conjunctivae normal.   Cardiovascular:      Heart sounds: No murmur heard.     Comments: S1 and S2 regular and tachycardic  Pulmonary:      Breath sounds: No wheezing, rhonchi or rales.   Abdominal:      General: There is no distension.      Palpations: Abdomen is soft. There is no mass.      Tenderness: There is abdominal tenderness. There is no right CVA tenderness, left CVA tenderness, guarding or rebound.      Hernia: No hernia is present.      Comments: Tender in RLQ and epigastric areas.    Musculoskeletal:      Cervical back: Neck supple.      Right lower leg: No edema.      Left lower leg: No edema.   Lymphadenopathy:      Cervical: No cervical adenopathy.   Skin:     General: Skin is warm and dry.      Findings: No lesion or rash.   Neurological:      General: No focal deficit present.      Mental Status: He is alert and oriented to person, place, and time.   Psychiatric:         Mood and Affect: Mood normal.         Behavior: Behavior normal.          Last Recorded Vitals  Blood pressure 136/88, pulse 94, temperature 37.1 °C (98.8 °F), temperature source Temporal, resp. rate 16, height 1.78 m (5' 10.08\"), weight 85 kg (187 lb 6.3 oz), SpO2 96%.    Relevant Results   Latest Reference Range & Units 07/28/24 18:35 07/28/24 20:18   GLUCOSE 74 - 99 mg/dL 143 (H)    SODIUM 136 - 145 mmol/L 135 (L)    POTASSIUM 3.5 - 5.3 mmol/L 3.7    CHLORIDE 98 - 107 mmol/L 98    Bicarbonate 21 - 32 mmol/L 26    Anion Gap 10 - 20 mmol/L 15    Blood Urea Nitrogen 6 - 23 mg/dL 10    Creatinine 0.50 - 1.30 mg/dL 0.95    EGFR >60 mL/min/1.73m*2 >90    Calcium 8.6 - 10.3 mg/dL 9.0    Albumin 3.4 - 5.0 g/dL 4.2    Alkaline Phosphatase 33 - 120 U/L 59    ALT 10 - 52 " U/L 33    AST 9 - 39 U/L 18    Bilirubin Total 0.0 - 1.2 mg/dL 3.0 (H)    Total Protein 6.4 - 8.2 g/dL 7.9    MAGNESIUM 1.60 - 2.40 mg/dL 1.72    Lactate 0.4 - 2.0 mmol/L 0.8    LEUKOCYTES (10*3/UL) IN BLOOD BY AUTOMATED COUNT, Belgian 4.4 - 11.3 x10*3/uL 17.6 (H)    nRBC 0.0 - 0.0 /100 WBCs 0.1 (H)    ERYTHROCYTES (10*6/UL) IN BLOOD BY AUTOMATED COUNT, Belgian 4.50 - 5.90 x10*6/uL 4.38 (L)    HEMOGLOBIN 13.5 - 17.5 g/dL 14.5    HEMATOCRIT 41.0 - 52.0 % 40.0 (L)    MCV 80 - 100 fL 91    MCH 26.0 - 34.0 pg 33.1    MCHC 32.0 - 36.0 g/dL 36.3 (H)    RED CELL DISTRIBUTION WIDTH 11.5 - 14.5 % 15.0 (H)    PLATELETS (10*3/UL) IN BLOOD AUTOMATED COUNT, Belgian 150 - 450 x10*3/uL 253    NEUTROPHILS/100 LEUKOCYTES IN BLOOD BY AUTOMATED COUNT, Belgian 40.0 - 80.0 % 74.8    Immature Granulocytes %, Automated 0.0 - 0.9 % 0.5    Lymphocytes % 13.0 - 44.0 % 13.2    Monocytes % 2.0 - 10.0 % 11.2    Eosinophils % 0.0 - 6.0 % 0.1    Basophils % 0.0 - 2.0 % 0.2    NEUTROPHILS (10*3/UL) IN BLOOD BY AUTOMATED COUNT, Belgian 1.20 - 7.70 x10*3/uL 13.18 (H)    Immature Granulocytes Absolute, Automated 0.00 - 0.70 x10*3/uL 0.08    Lymphocytes Absolute 1.20 - 4.80 x10*3/uL 2.33    Monocytes Absolute 0.10 - 1.00 x10*3/uL 1.98 (H)    Eosinophils Absolute 0.00 - 0.70 x10*3/uL 0.01    Basophils Absolute 0.00 - 0.10 x10*3/uL 0.03    Color, Urine Light-Yellow, Yellow, Dark-Yellow   Light-Yellow   Appearance, Urine Clear   Clear   Specific Gravity, Urine 1.005 - 1.035   1.032   pH, Urine 5.0, 5.5, 6.0, 6.5, 7.0, 7.5, 8.0   6.5   Protein, Urine NEGATIVE, 10 (TRACE), 20 (TRACE) mg/dL  30 (1+) !   Glucose, Urine Normal mg/dL  Normal   Blood, Urine NEGATIVE   1.0 (3+) !   Ketones, Urine NEGATIVE mg/dL  NEGATIVE   Bilirubin, Urine NEGATIVE   NEGATIVE   Urobilinogen, Urine Normal mg/dL  Normal   Nitrite, Urine NEGATIVE   NEGATIVE   Leukocyte Esterase, Urine NEGATIVE   NEGATIVE   RBC, Urine NONE, 1-2, 3-5 /HPF  3-5   WBC, Urine 1-5, NONE /HPF  11-20  !   (H): Data is abnormally high  (L): Data is abnormally low  !: Data is abnormal    CT ABD/PELVIS:     IMPRESSION:  1. Acute appendicitis with probable appendiceal rupture. No abscess  or free air.      2. Three solid my nodules in the left lower lobe measuring 0.6 cm,  0.3 cm, and 0.3 cm. In the setting of recently diagnosed testicular  cancer, differential possibilities include metastatic disease and  sequela of prior granulomatous infection. Recommend patient obtain CT  imaging/report and correlate with prior imaging studies if possible.  Recommend appropriate oncological follow-up and imaging surveillance.  No evidence of intra-abdominal lymphadenopathy or metastasis in the  abdomen.      3. Diffusely inflamed urinary bladder wall consistent with cystitis.  Correlation with urinalysis is recommended.      4. Ill-defined fat stranding along the right groin at site of  surgical approach for or he ectomy. No fluid collections.     Assessment/Plan   Acute appendicitis with probable appendiceal rupture  Admit to medical floor  Keep NPO  IVF  IV Zosyn  Pain control  General surgery consulted and planning surgery in am    Pulmonary nodules  LLL has 3 nodules measuring 0.3 cm to 0.6 cm  Follow up CT and oncology follow up recommended given history of recently discovered testicular cancer    Question of UTI  UA shows 11-20 WBC/hpf  CT shows evidence of cystitis  Urine culture  On Zosyn    Testicular cancer  Recently diagnosed  S/P right orchiectomy  Has oncology follow up appointment in [facility]    I spent 60 minutes in the professional and overall care of this patient.      Nahum Araya MD

## 2024-07-29 NOTE — ANESTHESIA PREPROCEDURE EVALUATION
Patient: Sheldon Acosta    Procedure Information       Date/Time: 07/29/24 1205    Procedure: APPENDECTOMY LAPAROSCOPY    Location: [facility] OR [facility] / [facility]    Surgeons: Kwesi Garza MD            Relevant Problems   Anesthesia (within normal limits)      Cardiac (within normal limits)      Pulmonary (within normal limits)      Neuro (within normal limits)      GI (within normal limits)      /Renal (within normal limits)      Liver   (+) Cholelithiases      Endocrine (within normal limits)      Hematology (within normal limits)      Musculoskeletal (within normal limits)      HEENT (within normal limits)      ID (within normal limits)      Skin (within normal limits)      GYN (within normal limits)      Genitourinary   (+) Cancer of right testis      Infectious/Inflammatory   (+) Acute appendicitis with perforation, generalized peritonitis, and abscess       Clinical information reviewed:   Tobacco  Allergies  Meds  Problems  Med Hx  Surg Hx   Fam Hx  Soc   Hx        NPO Detail:  NPO/Void Status  Date of Last Liquid: 07/28/24  Time of Last Liquid: 1700  Date of Last Solid: 07/28/24  Time of Last Solid: 1500         Physical Exam    Airway  Mallampati: III  TM distance: >3 FB  Neck ROM: full     Cardiovascular    Dental - normal exam     Pulmonary    Abdominal            Anesthesia Plan    History of general anesthesia?: yes  History of complications of general anesthesia?: no    ASA 2     general     The patient is not a current smoker.    intravenous induction   Postoperative administration of opioids is intended.  Trial extubation is planned.  Anesthetic plan and risks discussed with patient.  Use of blood products discussed with patient who consented to blood products.    Plan discussed with CRNA.

## 2024-07-29 NOTE — ANESTHESIA PROCEDURE NOTES
Airway  Date/Time: 7/29/2024 12:37 PM  Urgency: elective    Airway not difficult    Staffing  Performed: CRNA   Authorized by: Zhen Taylor DO    Performed by: MCKAYLA Trivedi-KURT  Patient location during procedure: OR    Indications and Patient Condition  Indications for airway management: anesthesia  Spontaneous Ventilation: absent  Sedation level: deep  Preoxygenated: yes  Patient position: sniffing  MILS not maintained throughout  Mask difficulty assessment: 0 - not attempted    Final Airway Details  Final airway type: endotracheal airway      Successful airway: ETT  Cuffed: yes   Successful intubation technique: video laryngoscopy  Endotracheal tube insertion site: oral  Blade: Wilfredo  Blade size: #3  ETT size (mm): 7.5  Cormack-Lehane Classification: grade I - full view of glottis  Placement verified by: chest auscultation and capnometry   Measured from: teeth  ETT to teeth (cm): 23  Number of attempts at approach: 1    Additional Comments  Atraumatic. RSI

## 2024-07-29 NOTE — HOSPITAL COURSE
Sheldon Acosta is a 33 y.o. male, visiting from [facility], with history of GERD, cholelithiasis and testicular cancer status post right orchiectomy presenting with abdominal pain. He is here on a business trip and arrived on July 26. He reports developing lower abdominal pain radiating to his back with bloating yesterday after eating dinner. He tried a couple of home remedies without improvement and by noon today he was still uncomfortable, so he presented to the ED. Labs in ED were notable mainly for a WBC of 17.6. CT of the abdomen and pelvis yielded acute appendicitis with suspicion for appendiceal rupture.  Also had incidental lung nodules, which in the setting of recent testicular cancer should be followed up by oncology. Surgery consulted. Underwent uncomplicated laparoscopic appendectomy 7/29. No rupture or abscess found.

## 2024-07-29 NOTE — PROGRESS NOTES
"    [facility]  Department of Hospital Medicine    PROGRESS NOTE    Sheldon Acosta is a 33 y.o. male on day 1 of admission presenting with Acute appendicitis with perforation, generalized peritonitis, and abscess.    Subjective   Seen postoperatively, pain is controlled.  No nausea.       Objective     Physical Exam:  Con: awake, alert; no distress;   Eyes: conjunctiva wnl; EOMI;   ENMT: hearing intact; MMM;   MSK: ROM wnl; digits wnl;   Resp: normal work of breathing; no cyanosis;   Neuro: moving all extremities; no abnormal movements  Psych: oriented to situation; affect wnl;     Last Recorded Vitals:  /75   Pulse 80   Temp 36.2 °C (97.2 °F)   Resp 16   Ht 1.727 m (5' 8\")   Wt 85 kg (187 lb 6.3 oz)   SpO2 92%   BMI 28.49 kg/m²      Scheduled medications:  acetaminophen, 650 mg, oral, q6h  enoxaparin, 40 mg, subcutaneous, q24h  ibuprofen, 600 mg, oral, q6h LORETTA  piperacillin-tazobactam, 3.375 g, intravenous, q6h  polyethylene glycol, 17 g, oral, Daily      Continuous medications:  D5 % and 0.9 % sodium chloride, 100 mL/hr, Last Rate: 100 mL/hr (07/29/24 1440)  lactated Ringer's, 100 mL/hr, Last Rate: Stopped (07/29/24 1319)      PRN medications:  PRN medications: alum-mag hydroxide-simeth, calcium carbonate, dextromethorphan-guaifenesin, guaiFENesin, ondansetron **OR** ondansetron, oxyCODONE, traMADol     Relevant Results:  Lab Results   Component Value Date    WBC 12.5 (H) 07/29/2024    HGB 12.2 (L) 07/29/2024    HCT 34.8 (L) 07/29/2024    MCV 94 07/29/2024     07/29/2024      Lab Results   Component Value Date    GLUCOSE 121 (H) 07/29/2024    CALCIUM 7.8 (L) 07/29/2024     07/29/2024    K 3.8 07/29/2024    CO2 27 07/29/2024     07/29/2024    BUN 9 07/29/2024    CREATININE 0.95 07/29/2024                Assessment/Plan   Principal Problem:    Acute appendicitis with perforation, generalized peritonitis, and abscess  Active Problems:   "  Lower abdominal pain    Sheldon Acosta is a 33 y.o. male, visiting from [facility], with history of GERD, cholelithiasis and testicular cancer status post right orchiectomy presenting with abdominal pain. He is here on a business trip and arrived on July 26. He reports developing lower abdominal pain radiating to his back with bloating yesterday after eating dinner. He tried a couple of home remedies without improvement and by noon today he was still uncomfortable, so he presented to the ED. Labs in ED were notable mainly for a WBC of 17.6. CT of the abdomen and pelvis yielded acute appendicitis with suspicion for appendiceal rupture.  Also had incidental lung nodules, which in the setting of recent testicular cancer should be followed up by oncology.    Acute appendicitis:  -Surgery consulted.  Status post laparoscopic appendectomy 7/29.  No rupture, abscess, or complication noted in op note.  -Zosyn overnight  -Diet advanced    Lung nodules:  -Should be followed up upon return to [facility]; has follow-up scheduled    DVT PPx enoxaparin    Dispo: Anticipate discharge in the morning if okay with surgery         Toro Newton MD    Patient Name:  Sheldon Acosta   MRN:   [id number]   Room/Bed:  114/114-A

## 2024-07-29 NOTE — ANESTHESIA POSTPROCEDURE EVALUATION
Patient: Sheldon Acosta    Procedure Summary       Date: 07/29/24 Room / Location: [facility] OR 07 / Virtual [facility] OR    Anesthesia Start: 1227 Anesthesia Stop: 1335    Procedure: APPENDECTOMY LAPAROSCOPY Diagnosis:       Acute appendicitis with perforation, generalized peritonitis, and abscess, unspecified whether gangrene present      (Acute appendicitis with perforation, generalized peritonitis, and abscess, unspecified whether gangrene present [K35.211])    Surgeons: Kwesi Garza MD Responsible Provider: Zhen Taylor DO    Anesthesia Type: general ASA Status: 2            Anesthesia Type: general    Vitals Value Taken Time   /71 07/29/24 1346   Temp 36.2 °C (97.2 °F) 07/29/24 1331   Pulse 84 07/29/24 1346   Resp 18 07/29/24 1346   SpO2 97 % 07/29/24 1346       Anesthesia Post Evaluation    Patient location during evaluation: PACU  Patient participation: complete - patient participated  Level of consciousness: awake  Pain management: adequate  Multimodal analgesia pain management approach  Airway patency: patent  Two or more strategies used to mitigate risk of obstructive sleep apnea  Cardiovascular status: acceptable  Respiratory status: acceptable  Hydration status: acceptable  Postoperative Nausea and Vomiting: none        No notable events documented.

## 2024-07-29 NOTE — OP NOTE
APPENDECTOMY LAPAROSCOPY     Operative Date: 07/29/24  Patient's Name: Sheldon Acosta  Patient's YOB: 1990  Patient's MRN: [id number]  Patient's Age: 33 y.o.  Operating Room Location: [facility]  Patient's ASA: II  Patient's Estimated Blood Loss: 5 mL        PREOPERATIVE DIAGNOSIS:   Acute appendicitis        POSTOPERATIVE DIAGNOSIS:   Acute appendicitis        OPERATION/PROCEDURE:   Laparoscopic appendectomy        SURGEON:   Kwesi Garza MD.         ASSISTANT:   AISHWARYA Rodriguez    No surgical resident was available to assist.  No hospital-employed assistant was available to assist.  The PA assisted with opening, exposure, dissection, and closure.            INDICATIONS:   This is a 33 y.o. male who presented with acute appendicitis.  There was concern on imaging for perforation.  He is also from [facility] and is here for work.  He plans to return to [facility] in a week.  He also had a right orchiectomy for testicular cancer.        OPERATION:   The reasons for, benefits to, and risks of surgery were discussed with the patient.  The risks included, but not limited to, bleeding, infection, persistent pain, injury to surrounding structures, and postoperative abscess.  The patient appeared to understand and consented for surgery.  He was therefore brought back to the operating room and placed on the operating room table in the supine position.  His abdomen was prepped and draped in a standard fashion.       We accessed the abdomen in the left upper quadrant with a Veress needle.  We then insufflated the pressure.  After insufflating to pressure, we made a 5 mm incision periumbilically.  We then entered the abdomen with a 5 mm optical view port.  We then removed the Veress needle.  We then performed our bilateral transversus abdominis plane block, instilling 15 mL of half percent Marcaine into each transversus abdominis plane under direct visualization.  We  then placed our two 5 mm working ports, one suprapubically and one in the left lower quadrant.  We then placed the patient in Trendelenburg positioning with right side up.  We then inspected the right lower quadrant and found the appendix.  We then bluntly dissected a window through the mesoappendix at the base of the appendix, where it came off of the cecum.  We then used our LigaSure to transect the mesoappendix along the length of the appendix to our window at the base of the appendix.  We then used 2 separate 0 PDS Endoloops to ligate the appendix at the base of the appendix.  We then used a LigaSure to transect the appendix above our Endoloops.  We then used an Endo Catch bag to extract the appendix through the periumbilical 5 mm port site.  This did require some upsizing of the port site.  We then closed the fascia of this port site with an interrupted figure-of-eight 0 Vicryl suture on a Nawaf-Antonia suture passer.  We then removed the remaining ports under direct visualization.  We desufflated the abdomen.  We closed the skin of all of our port sites with subcuticular 4-0 Vicryl suture.  Dermabond was applied over top.         DISPOSITION:   The patient tolerated the procedure well.  There were no immediate complications.  He will be brought to the postanesthesia care unit. From there, he will be discharged home with outpatient followup with me.      I was present and scrubbed in for the entire procedure.      CPT Code:  92022       Operating Room Staff:  Anesthesiologist: Zhen Taylor DO  CRNA: MCKALYA Trivedi-CRNA  Circulator: Karen Becerra RN; Annalise Villarreal RN; Yazmin Orosco RN; Cheri Lozano RN  Scrub Person: Senaitlaura FRANKLINA: EUNICE Meraz MD  General Surgery  Office: [phone]  Fax:     [phone]  1:19 PM  07/29/24

## 2024-07-29 NOTE — CARE PLAN
The patient's goals for the shift include pain control.    The clinical goals for the shift include pt will have less pain and go to OR for appendix      Problem: Pain  Goal: Takes deep breaths with improved pain control throughout the shift  Outcome: Progressing  Goal: Turns in bed with improved pain control throughout the shift  Outcome: Progressing  Goal: Walks with improved pain control throughout the shift  Outcome: Progressing  Goal: Performs ADL's with improved pain control throughout shift  Outcome: Progressing  Goal: Participates in PT with improved pain control throughout the shift  Outcome: Progressing  Goal: Free from opioid side effects throughout the shift  Outcome: Progressing  Goal: Free from acute confusion related to pain meds throughout the shift  Outcome: Progressing

## 2024-07-29 NOTE — NURSING NOTE
0732 Nurse-to-nurse bedside shift report received. Pt. Awake sitting upright in bed, denies any pain and denies any concerns at this time. Call light within reach, pt's door closed per pt. Preference  1400 Rec report from PACU RN that pt. underwent appendectomy by Dr. Garza with no complaints nor complications. VSS, EBL 5ml, received 2g Ancef and has 4 lap sites secured with skin glue. Pt en route to return to 1S room 114.

## 2024-07-29 NOTE — PROGRESS NOTES
07/29/24 0904   Discharge Planning   Living Arrangements Friends   Support Systems Friends/neighbors   Assistance Needed A&OX3; independent with ADLs with no DME; drives (not in [place]); room air baseline and currentlly room air; presently staying at [facility] on business   Type of Residence Private residence   Number of Stairs to Enter Residence 0   Number of Stairs Within Residence 0   Do you have animals or pets at home? No   Who is requesting discharge planning? Provider   Expected Discharge Disposition Home  (TBD pending GI/Surgery workup)

## 2024-07-30 ENCOUNTER — PHARMACY VISIT (OUTPATIENT)
Dept: PHARMACY | Facility: CLINIC | Age: 34
End: 2024-07-30
Payer: COMMERCIAL

## 2024-07-30 VITALS
SYSTOLIC BLOOD PRESSURE: 138 MMHG | DIASTOLIC BLOOD PRESSURE: 83 MMHG | TEMPERATURE: 98.6 F | OXYGEN SATURATION: 98 % | BODY MASS INDEX: 28.4 KG/M2 | RESPIRATION RATE: 16 BRPM | HEART RATE: 81 BPM | WEIGHT: 187.39 LBS | HEIGHT: 68 IN

## 2024-07-30 PROBLEM — K35.211 ACUTE APPENDICITIS WITH PERFORATION, GENERALIZED PERITONITIS, AND ABSCESS: Status: RESOLVED | Noted: 2024-07-28 | Resolved: 2024-07-30

## 2024-07-30 PROBLEM — R10.30 LOWER ABDOMINAL PAIN: Status: RESOLVED | Noted: 2024-07-28 | Resolved: 2024-07-30

## 2024-07-30 LAB
ALBUMIN SERPL BCP-MCNC: 3.2 G/DL (ref 3.4–5)
ANION GAP SERPL CALC-SCNC: 10 MMOL/L (ref 10–20)
BACTERIA UR CULT: NO GROWTH
BUN SERPL-MCNC: 10 MG/DL (ref 6–23)
CALCIUM SERPL-MCNC: 7.9 MG/DL (ref 8.6–10.3)
CHLORIDE SERPL-SCNC: 107 MMOL/L (ref 98–107)
CO2 SERPL-SCNC: 27 MMOL/L (ref 21–32)
CREAT SERPL-MCNC: 0.82 MG/DL (ref 0.5–1.3)
EGFRCR SERPLBLD CKD-EPI 2021: >90 ML/MIN/1.73M*2
ERYTHROCYTE [DISTWIDTH] IN BLOOD BY AUTOMATED COUNT: 14.8 % (ref 11.5–14.5)
GLUCOSE SERPL-MCNC: 152 MG/DL (ref 74–99)
HCT VFR BLD AUTO: 34.5 % (ref 41–52)
HGB BLD-MCNC: 12.1 G/DL (ref 13.5–17.5)
MAGNESIUM SERPL-MCNC: 1.98 MG/DL (ref 1.6–2.4)
MCH RBC QN AUTO: 32.6 PG (ref 26–34)
MCHC RBC AUTO-ENTMCNC: 35.1 G/DL (ref 32–36)
MCV RBC AUTO: 93 FL (ref 80–100)
NRBC BLD-RTO: 0 /100 WBCS (ref 0–0)
PHOSPHATE SERPL-MCNC: 2.5 MG/DL (ref 2.5–4.9)
PLATELET # BLD AUTO: 207 X10*3/UL (ref 150–450)
POTASSIUM SERPL-SCNC: 3.9 MMOL/L (ref 3.5–5.3)
RBC # BLD AUTO: 3.71 X10*6/UL (ref 4.5–5.9)
SODIUM SERPL-SCNC: 140 MMOL/L (ref 136–145)
WBC # BLD AUTO: 12.8 X10*3/UL (ref 4.4–11.3)

## 2024-07-30 PROCEDURE — 99239 HOSP IP/OBS DSCHRG MGMT >30: CPT | Performed by: NURSE PRACTITIONER

## 2024-07-30 PROCEDURE — 2500000004 HC RX 250 GENERAL PHARMACY W/ HCPCS (ALT 636 FOR OP/ED): Performed by: SURGERY

## 2024-07-30 PROCEDURE — 83735 ASSAY OF MAGNESIUM: CPT | Performed by: SURGERY

## 2024-07-30 PROCEDURE — RXMED WILLOW AMBULATORY MEDICATION CHARGE

## 2024-07-30 PROCEDURE — 85027 COMPLETE CBC AUTOMATED: CPT | Performed by: SURGERY

## 2024-07-30 PROCEDURE — 2500000001 HC RX 250 WO HCPCS SELF ADMINISTERED DRUGS (ALT 637 FOR MEDICARE OP): Performed by: SURGERY

## 2024-07-30 PROCEDURE — 36415 COLL VENOUS BLD VENIPUNCTURE: CPT | Performed by: SURGERY

## 2024-07-30 PROCEDURE — 80069 RENAL FUNCTION PANEL: CPT | Performed by: SURGERY

## 2024-07-30 ASSESSMENT — COGNITIVE AND FUNCTIONAL STATUS - GENERAL
DAILY ACTIVITIY SCORE: 24
MOBILITY SCORE: 24

## 2024-07-30 ASSESSMENT — PAIN SCALES - GENERAL: PAINLEVEL_OUTOF10: 0 - NO PAIN

## 2024-07-30 NOTE — PROGRESS NOTES
"General/Trauma Surgery Daily Progress Note    Subjective   Doing well, tolerated breakfast. Ambulatory around room. Denies nausea, pain is appropriate.       Objective   Last Recorded Vitals:  Blood pressure 138/83, pulse 81, temperature 37 °C (98.6 °F), temperature source Temporal, resp. rate 16, height 1.727 m (5' 8\"), weight 85 kg (187 lb 6.3 oz), SpO2 98%.    Intake/Output last 3 Shifts:  I/O last 3 completed shifts:  In: 6521.7 (76.7 mL/kg) [P.O.:240; I.V.:3381.7 (39.8 mL/kg); IV Piggyback:2900]  Out: 1850 (21.8 mL/kg) [Urine:1850 (0.6 mL/kg/hr)]  Weight: 85 kg     Pain Score:  0-10 (Numeric) Pain Score: 0 - No pain     Physical Exam:  Constitutional: No acute distress, sitting up in bed.  Neuro: Alert, oriented. Follows commands.   Eyes: EOMI. No scleral icterus. Conjunctiva pink.  ENT: MMM.   Heart: Regular rate.  Respiratory: No increased work of breathing or audible wheeze.  Abdomen: Soft, nondistended. Appropriately tender. Incisions clean, dry, intact.   MSK: Moves all extremities.  Vascular: Palpable pulses throughout, no pitting edema.  Skin: No rashes.   Psychological: Appropriate mood and behavior.            Relevant Results  Laboratory Results:  CBC:   Lab Results   Component Value Date    WBC 12.8 (H) 07/30/2024    RBC 3.71 (L) 07/30/2024    HGB 12.1 (L) 07/30/2024    HCT 34.5 (L) 07/30/2024     07/30/2024       RFP:   Lab Results   Component Value Date     07/30/2024    K 3.9 07/30/2024     07/30/2024    CO2 27 07/30/2024    BUN 10 07/30/2024    CREATININE 0.82 07/30/2024    CALCIUM 7.9 (L) 07/30/2024    MG 1.98 07/30/2024    PHOS 2.5 07/30/2024        LFTs:   Lab Results   Component Value Date    PROT 7.9 07/28/2024    ALBUMIN 3.2 (L) 07/30/2024    BILITOT 3.0 (H) 07/28/2024    ALKPHOS 59 07/28/2024    AST 18 07/28/2024    ALT 33 07/28/2024             Assessment/Plan   This is a 33 y.o. male now post operative day #1 from a laparoscopic appendectomy.  Doing well.      Plan: "   -- Regular diet as tolerated  -- No heavy lifting > 10 lb for 4 weeks  -- Ok to shower  -- Tylenol, ibuprofen, prn narcotic  -- OK to discharge from surgical standpoint           Seen and discussed with Dr. Garza who is in agreement with plan. Please doc halo with questions.    Ila Mcdonough PA-C

## 2024-07-30 NOTE — CARE PLAN
The patient's goals for the shift include rest and pain control.    The clinical goals for the shift include Pt will be free from injury by end of shift    Problem: Pain  Goal: Takes deep breaths with improved pain control throughout the shift  Outcome: Progressing  Goal: Turns in bed with improved pain control throughout the shift  Outcome: Progressing  Goal: Walks with improved pain control throughout the shift  Outcome: Progressing  Goal: Performs ADL's with improved pain control throughout shift  Outcome: Progressing  Goal: Participates in PT with improved pain control throughout the shift  Outcome: Progressing  Goal: Free from opioid side effects throughout the shift  Outcome: Progressing  Goal: Free from acute confusion related to pain meds throughout the shift  Outcome: Progressing

## 2024-07-30 NOTE — PROGRESS NOTES
07/30/24 0900   Discharge Planning   Expected Discharge Disposition Home  (Pt aware of dc today. Is able to arrange transport once dc time know. Dr Garza has patient following up outpatient with surgeon in patient's country [place] origin. Pt cleared to dc from transitions standpoint.)        07/30/24 0950   Discharge Planning   Expected Discharge Disposition Home  (Prior to discharge, patient needs an itemized insurance breakdown from billing dept. Billing dept made aware via phone call and they are reaching out to supervisor to escalate)

## 2024-07-30 NOTE — NURSING NOTE
0730 Assumed care of patient. Patient stable.     0810 Passed patient's morning medications and performed assessment. No new symptoms. Patient not in pain. Lap sites intact. Will continue to report any changes.

## 2024-07-30 NOTE — DISCHARGE SUMMARY
Discharge Diagnosis  Acute appendicitis with perforation, generalized peritonitis, and abscess    Issues Requiring Follow-Up  Post op follow up    Discharge Meds     Your medication list        START taking these medications        Instructions Last Dose Given Next Dose Due   acetaminophen 325 mg tablet  Commonly known as: Tylenol      Take 2 tablets (650 mg) by mouth every 6 hours for 10 doses.       ibuprofen 600 mg tablet      Take 1 tablet (600 mg) by mouth every 6 hours for 10 doses.       ondansetron ODT 4 mg disintegrating tablet  Commonly known as: Zofran-ODT      Take 1 tablet (4 mg) by mouth every 6 hours if needed for nausea or vomiting for up to 15 doses.       oxyCODONE 5 mg immediate release tablet  Commonly known as: Roxicodone      Take 1 tablet (5 mg) by mouth every 6 hours if needed for severe pain (7 - 10) for up to 5 doses.       polyethylene glycol 17 gram/dose powder  Commonly known as: Glycolax, Miralax      Take 17 g by mouth once daily for 10 days.                 Where to Get Your Medications        These medications were sent to [facility]  [id number], [facility] [address]      Hours: 9 AM to 5 PM Mon-Fri Phone: [phone]   acetaminophen 325 mg tablet  ibuprofen 600 mg tablet  ondansetron ODT 4 mg disintegrating tablet  oxyCODONE 5 mg immediate release tablet  polyethylene glycol 17 gram/dose powder         Test Results Pending At Discharge  Pending Labs       Order Current Status    Surgical Pathology Exam In process    Blood Culture Preliminary result    Blood Culture Preliminary result            Hospital Course  Sheldon Acosta is a 33 y.o. male, visiting from [facility], with history of GERD, cholelithiasis and testicular cancer status post right orchiectomy presenting with abdominal pain. He is here on a business trip and arrived on July 26. He reports developing lower abdominal pain radiating to his back with bloating yesterday after eating dinner. He  tried a couple of home remedies without improvement and by noon today he was still uncomfortable, so he presented to the ED. Labs in ED were notable mainly for a WBC of 17.6. CT of the abdomen and pelvis yielded acute appendicitis with suspicion for appendiceal rupture.  Also had incidental lung nodules, which in the setting of recent testicular cancer should be followed up by oncology. Surgery consulted. Underwent uncomplicated laparoscopic appendectomy 7/29. No rupture or abscess found. He plans to follow up when he returns home.  acetaminophen 325 mg tablet ibuprofen 600 mg tablet  ondansetron ODT 4 mg disintegrating tablet  oxyCODONE 5 mg immediate release tablet  And  polyethylene glycol 17 gram/dose powder sent to our pharmacy for delivery to bedside.     Pertinent Physical Exam At Time of Discharge  Physical Exam  Constitutional:       Appearance: Normal appearance. He is normal weight.   HENT:      Head: Normocephalic and atraumatic.      Nose: Nose normal.      Mouth/Throat:      Mouth: Mucous membranes are moist.      Pharynx: Oropharynx is clear.   Eyes:      Extraocular Movements: Extraocular movements intact.      Conjunctiva/sclera: Conjunctivae normal.      Pupils: Pupils are equal, round, and reactive to light.   Cardiovascular:      Rate and Rhythm: Normal rate and regular rhythm.      Pulses: Normal pulses.      Heart sounds: Normal heart sounds.   Pulmonary:      Effort: Pulmonary effort is normal.      Breath sounds: Normal breath sounds.   Abdominal:      General: Abdomen is flat. Bowel sounds are normal.      Palpations: Abdomen is soft.      Comments: Puncture sites well approximated    Musculoskeletal:         General: Normal range of motion.   Skin:     General: Skin is warm and dry.      Capillary Refill: Capillary refill takes less than 2 seconds.   Neurological:      General: No focal deficit present.      Mental Status: He is alert.   Psychiatric:         Mood and Affect: Mood normal.          Behavior: Behavior normal.         Thought Content: Thought content normal.         Judgment: Judgment normal.         Outpatient Follow-Up  No future appointments.      Vika Alanis, APRN-CNP

## 2024-08-01 NOTE — DOCUMENTATION CLARIFICATION NOTE
PATIENT:               DIEGO JEONG  ACCT #:                  [id number]  MRN:                       [id number]  :                       1990  ADMIT DATE:       2024 6:19 PM  DISCH DATE:        2024 1:43 PM  RESPONDING PROVIDER #:        [id number]          PROVIDER RESPONSE TEXT:    Non-infectious SIRS without acute organ dysfunction    CDI QUERY TEXT:    Clarification    Instruction:    Based on your assessment of the patient and the clinical information, please provide the requested documentation by clicking on the appropriate radio button and enter any additional information if prompted.    Question: Please further clarify if there is a diagnosis related to the clinical information    When answering this query, please exercise your independent professional judgment. The fact that a question is being asked, does not imply that any particular answer is desired or expected.    The patient's clinical indicators include:  Clinical Information: 33 y.o. male, visiting from [facility], with history of GERD, cholelithiasis and testicular cancer presenting with abdominal pain; admitted for acute appendicitis.    Clinical Indicators:  Admitting VS:  136/88, 94, 37.1 ?C (98.8 ?F),  Temporal, 16, SpO2 96%, BMI: 28    HR: 120/118/101  T: 38  WBC: 17.6/12.5    Treatment: laparoscopic appendectomy, Zosyn 4.5g IV, NaCl 500ml bolus    Risk Factors: appendicitis, hx of testicular Ca  Options provided:  -- Non-infectious SIRS without acute organ dysfunction  -- Other - I will add my own diagnosis  -- Refer to Clinical Documentation Reviewer    Query created by: Laura Taveras on 2024 7:02 AM      Electronically signed by:  DORA BARRON 2024 1:18 PM

## 2024-08-02 LAB
BACTERIA BLD CULT: NORMAL
BACTERIA BLD CULT: NORMAL

## 2024-08-02 NOTE — SIGNIFICANT EVENT
Follow Up Phone Call    Outgoing phone call    Spoke to: Sheldon Zabala Shayneberonica Relationship:self   Phone number: [phone]      Outcome: contacted patient/ family   Chief Complaint   Patient presents with    Abdominal Pain          Diagnosis:Not applicable    States he is feeling better.  States his dressing is dry and intact, denies fever or chills.  Bowels are moving. Returning to [facility] this week. Will follow up with his PCP. No further questions or concerns.

## 2024-08-06 LAB
LABORATORY COMMENT REPORT: NORMAL
PATH REPORT.FINAL DX SPEC: NORMAL
PATH REPORT.GROSS SPEC: NORMAL
PATH REPORT.RELEVANT HX SPEC: NORMAL
PATH REPORT.TOTAL CANCER: NORMAL

## 2025-05-21 NOTE — PROGRESS NOTES
The patient was seen by the midlevel/resident.  I have personally saw the patient and made/approved the management plan and take responsibility for the patient management.  I reviewed the EKG's (when done) and agree with the interpretation.  I have seen and examined the patient; agree with the workup, evaluation, MDM, and diagnosis.  The care plan has been discussed with the midlevel/resident; I have reviewed the note and agree with the documented findings.     Presents with lower abdominal pain.  CT scan showed acute appendicitis with rupture.  Spoke to surgical consultant Dr. Garza who recommends IV antibiotics and hospitalization.  Patient does not clinically appear to be septic.  I talked to the patient and his friends at bedside and then to his wife and child who were on the phone.  Diagnoses as of 07/29/24 0414   Lower abdominal pain   Acute appendicitis with perforation and generalized peritonitis, unspecified whether abscess present, unspecified whether gangrene present     Raheem Almanza MD      Detail Level: Generalized

## (undated) DEVICE — ELECTRODE, ELECTROSURGICAL, BLADE, INSULATED, ENT/IMA, STERILE

## (undated) DEVICE — APPLICATOR, CHLORAPREP, W/ORANGE TINT, 26ML

## (undated) DEVICE — SUTURE, VICRYL, 4-0, 18 IN, PS2, UNDYED

## (undated) DEVICE — NEEDLE, INSUFFLATION, 13GAX120MM, DISP

## (undated) DEVICE — Device

## (undated) DEVICE — TUBE SET, PNEUMOCLEAR, SMOKE EVACU, HIGH-FLOW

## (undated) DEVICE — CARE KIT, LAPAROSCOPIC, ADVANCED

## (undated) DEVICE — ADHESIVE, SKIN, LIQUIBAND EXCEED

## (undated) DEVICE — TROCAR, OPTICAL BLADELESS 5MM X 100 W/ADVANCED FIXATION

## (undated) DEVICE — ASSEMBLY, STRYKER FLOW 2, SUCTION IRRIGATOR, WITH TIP

## (undated) DEVICE — NEEDLE, SAFETY, 21 G X 1.5 IN

## (undated) DEVICE — CAUTERY, PENCIL, PUSH BUTTON, SMOKE EVAC, 70MM

## (undated) DEVICE — COVER HANDLE LIGHT, STERIS, BLUE, STERILE

## (undated) DEVICE — RETRIEVAL SYSTEM, MONARCH INZII, 5MM

## (undated) DEVICE — SCISSOR, MINI ENDO CUT, TIPS, DISP

## (undated) DEVICE — DEVICE, VOYANT, 5MM X 37CM

## (undated) DEVICE — DRAPE PACK, LAPAROSCOPIC CHOLECYSTECTOMY, CUSTOM, GEAUGA

## (undated) DEVICE — SLEEVE, KII, Z-THREAD, 5X100CM

## (undated) DEVICE — DRAPE, INSTRUMENT, W/POUCH, STERI DRAPE, 9 5/8 X 18 LONG

## (undated) DEVICE — SOLUTION, IRRIGATION, SODIUM CHLORIDE 0.9%, 1000 ML, POUR BOTTLE